# Patient Record
Sex: MALE | Race: WHITE
[De-identification: names, ages, dates, MRNs, and addresses within clinical notes are randomized per-mention and may not be internally consistent; named-entity substitution may affect disease eponyms.]

---

## 2020-05-13 ENCOUNTER — HOSPITAL ENCOUNTER (OUTPATIENT)
Dept: HOSPITAL 88 - RAD | Age: 55
End: 2020-05-13
Attending: INTERNAL MEDICINE
Payer: COMMERCIAL

## 2020-05-13 DIAGNOSIS — M12.832: ICD-10-CM

## 2020-05-13 DIAGNOSIS — M25.532: Primary | ICD-10-CM

## 2020-05-13 NOTE — DIAGNOSTIC IMAGING REPORT
Exam:  Left wrist 3 views



History:  Wrist pain



Comparison: None.



Findings: Remote scaphoid waist fracture with nonunion and sclerotic change of

the proximal pole. Volar rotation of the distal scaphoid causing a normal

articulation with the radial styloid and arthrosis. Proximal migration of the

capitate with narrowing of the capital lunate interval



Impression:



Scaphoid nonunion advanced collapse arthropathy



Signed by: Dr. Andrew Palisch, M.D. on 5/13/2020 9:11 AM